# Patient Record
Sex: FEMALE | Employment: FULL TIME | URBAN - METROPOLITAN AREA
[De-identification: names, ages, dates, MRNs, and addresses within clinical notes are randomized per-mention and may not be internally consistent; named-entity substitution may affect disease eponyms.]

---

## 2019-12-12 ENCOUNTER — OFFICE VISIT (OUTPATIENT)
Dept: FAMILY MEDICINE CLINIC | Facility: CLINIC | Age: 34
End: 2019-12-12
Payer: COMMERCIAL

## 2019-12-12 VITALS
HEART RATE: 91 BPM | SYSTOLIC BLOOD PRESSURE: 142 MMHG | BODY MASS INDEX: 27.71 KG/M2 | OXYGEN SATURATION: 99 % | TEMPERATURE: 98.4 F | DIASTOLIC BLOOD PRESSURE: 82 MMHG | HEIGHT: 63 IN | WEIGHT: 156.4 LBS

## 2019-12-12 DIAGNOSIS — E66.3 OVER WEIGHT: ICD-10-CM

## 2019-12-12 DIAGNOSIS — J20.9 ACUTE BRONCHITIS, UNSPECIFIED ORGANISM: Primary | ICD-10-CM

## 2019-12-12 DIAGNOSIS — Z53.20 HIV SCREENING DECLINED: ICD-10-CM

## 2019-12-12 DIAGNOSIS — R03.0 BLOOD PRESSURE ELEVATED WITHOUT HISTORY OF HTN: ICD-10-CM

## 2019-12-12 PROCEDURE — 3008F BODY MASS INDEX DOCD: CPT | Performed by: FAMILY MEDICINE

## 2019-12-12 PROCEDURE — 1036F TOBACCO NON-USER: CPT | Performed by: FAMILY MEDICINE

## 2019-12-12 PROCEDURE — 99204 OFFICE O/P NEW MOD 45 MIN: CPT | Performed by: FAMILY MEDICINE

## 2019-12-12 RX ORDER — AZITHROMYCIN 250 MG/1
TABLET, FILM COATED ORAL
Qty: 6 TABLET | Refills: 0 | Status: SHIPPED | OUTPATIENT
Start: 2019-12-12 | End: 2019-12-17

## 2019-12-12 RX ORDER — DIPHENOXYLATE HYDROCHLORIDE AND ATROPINE SULFATE 2.5; .025 MG/1; MG/1
1 TABLET ORAL DAILY
COMMUNITY

## 2019-12-12 NOTE — PROGRESS NOTES
Assessment/Plan:          Diagnoses and all orders for this visit:    Acute bronchitis, unspecified organism  Comments:  Patient to call if not better or worse  Orders:  -     azithromycin (ZITHROMAX) 250 mg tablet; Take 2 tablets today then 1 tablet daily x 4 days    Blood pressure elevated without history of HTN  Comments:  Most likely secondary to ibuprofen and cough medicine  Advised pt to return to recheck BP  To check blood pressure at her office  If 130/80 or higher ,to call    HIV screening declined    Over weight  Comments:  Diet and exercise discussed    Other orders  -     multivitamin (THERAGRAN) TABS; Take 1 tablet by mouth daily  -     pseudoephedrine-acetaminophen (TYLENOL SINUS)  MG TABS; Take 1 tablet by mouth every 4 (four) hours as needed for congestion            Subjective:     Patient ID: Eri Enamorado is a 27 y o  female      Cough   started 1 week ago  Productive raising yellow phlegm  Moderate  Persistent  Getting worse  Patient felt feverish  But did not check her temperature  Positive chills  Denied chest pain or shortness of breath  Denied hemoptysis  Patient does not smoke  Patient has been taking cough medicine and ibuprofen  Elevated blood pressure today at the office  Patient denied any history of hypertension  Denied headache or dizziness  Overweight  Admit to regular food intake  Denied change in the weight  Cold intolerance or fatigue      Review of Systems   Constitutional: Positive for chills and fever  Negative for activity change, appetite change, fatigue and unexpected weight change  HENT: Negative for congestion, ear discharge, ear pain, hearing loss, nosebleeds, rhinorrhea, sinus pressure, sore throat, tinnitus, trouble swallowing and voice change  Eyes: Negative for photophobia, pain and visual disturbance  Respiratory: Positive for cough  Negative for chest tightness, shortness of breath and wheezing      Cardiovascular: Negative for chest pain, palpitations and leg swelling  Gastrointestinal: Negative for abdominal pain, anal bleeding, blood in stool, constipation, diarrhea, nausea and vomiting  Endocrine: Negative for cold intolerance, heat intolerance, polydipsia and polyuria  Genitourinary: Negative for dysuria, frequency, hematuria, urgency, vaginal bleeding and vaginal discharge  Musculoskeletal: Negative for arthralgias, back pain, gait problem, joint swelling, myalgias and neck pain  Skin: Negative for rash  Allergic/Immunologic: Negative for immunocompromised state  Neurological: Negative for dizziness, tremors, seizures, syncope, weakness, light-headedness and headaches  Hematological: Negative for adenopathy  Does not bruise/bleed easily  Psychiatric/Behavioral: Negative for agitation, behavioral problems, confusion, dysphoric mood, hallucinations and sleep disturbance  The patient is not nervous/anxious and is not hyperactive  Objective:     Physical Exam   Constitutional: She is oriented to person, place, and time  She appears well-developed and well-nourished  She does not appear ill  No distress  HENT:   Head: Normocephalic and atraumatic  Right Ear: Tympanic membrane and ear canal normal  No drainage  Left Ear: Tympanic membrane and ear canal normal  No drainage  Mouth/Throat: Mucous membranes are normal  No oral lesions  No uvula swelling  Posterior oropharyngeal erythema present  No oropharyngeal exudate or posterior oropharyngeal edema  Tonsils are 0 on the right  Tonsils are 0 on the left  No tonsillar exudate  Eyes: Pupils are equal, round, and reactive to light  Conjunctivae and EOM are normal  No scleral icterus  Neck: Neck supple  No JVD present  No thyromegaly present  Cardiovascular: Normal rate, regular rhythm and normal heart sounds  No murmur heard  Extremities  No edema   Pulmonary/Chest: Effort normal  She has no wheezes  She has rhonchi  She has no rales  Abdominal: Soft   She exhibits no distension and no mass  There is no tenderness  There is no rebound and no guarding  No hernia  Lymphadenopathy:     She has cervical adenopathy  Neurological: She is alert and oriented to person, place, and time  No cranial nerve deficit  She exhibits normal muscle tone  Coordination normal    Gait is normal   Skin: No rash noted  She is not diaphoretic  Psychiatric: She has a normal mood and affect  Her behavior is normal  Judgment normal    BMI Counseling: Body mass index is 27 71 kg/m²  The BMI is above normal  Nutrition recommendations include decreasing portion sizes

## 2020-01-29 ENCOUNTER — OFFICE VISIT (OUTPATIENT)
Dept: FAMILY MEDICINE CLINIC | Facility: CLINIC | Age: 35
End: 2020-01-29
Payer: COMMERCIAL

## 2020-01-29 VITALS
OXYGEN SATURATION: 99 % | BODY MASS INDEX: 27.68 KG/M2 | TEMPERATURE: 98.4 F | DIASTOLIC BLOOD PRESSURE: 74 MMHG | WEIGHT: 156.2 LBS | HEIGHT: 63 IN | SYSTOLIC BLOOD PRESSURE: 149 MMHG | HEART RATE: 100 BPM

## 2020-01-29 DIAGNOSIS — Z13.220 SCREENING FOR LIPID DISORDERS: ICD-10-CM

## 2020-01-29 DIAGNOSIS — Z28.20 IMMUNIZATION NOT CARRIED OUT BECAUSE OF PATIENT DECISION: ICD-10-CM

## 2020-01-29 DIAGNOSIS — L65.9 HAIR LOSS: ICD-10-CM

## 2020-01-29 DIAGNOSIS — Z13.1 SCREENING FOR DIABETES MELLITUS: ICD-10-CM

## 2020-01-29 DIAGNOSIS — E66.3 OVER WEIGHT: ICD-10-CM

## 2020-01-29 DIAGNOSIS — I10 HYPERTENSION, UNSPECIFIED TYPE: ICD-10-CM

## 2020-01-29 DIAGNOSIS — Z00.00 WELL ADULT EXAM: Primary | ICD-10-CM

## 2020-01-29 DIAGNOSIS — Z12.4 SCREENING FOR CERVICAL CANCER: ICD-10-CM

## 2020-01-29 PROCEDURE — 1036F TOBACCO NON-USER: CPT | Performed by: FAMILY MEDICINE

## 2020-01-29 PROCEDURE — 3078F DIAST BP <80 MM HG: CPT | Performed by: FAMILY MEDICINE

## 2020-01-29 PROCEDURE — 99395 PREV VISIT EST AGE 18-39: CPT | Performed by: FAMILY MEDICINE

## 2020-01-29 PROCEDURE — 3077F SYST BP >= 140 MM HG: CPT | Performed by: FAMILY MEDICINE

## 2020-01-29 PROCEDURE — 3008F BODY MASS INDEX DOCD: CPT | Performed by: FAMILY MEDICINE

## 2020-01-29 PROCEDURE — 99214 OFFICE O/P EST MOD 30 MIN: CPT | Performed by: FAMILY MEDICINE

## 2020-01-29 NOTE — PROGRESS NOTES
Assessment/Plan:       No problem-specific Assessment & Plan notes found for this encounter  Diagnoses and all orders for this visit:    Well adult exam  Comments:  Diet discussed  Also advised to exercise about 150 minutes a week    Hypertension, unspecified type  Comments:  Most likely benign HTN  Pt to continue check blood pressure at her office and call me with the results  To consider rule out secondary HTN  pt declined CXR  Orders:  -     Comprehensive metabolic panel; Future  -     UA w Reflex to Microscopic w Reflex to Culture  -     ECG 12 lead; Future    Hair loss  Comments:  Patient declined to check RPR,Testosterone level, DHEA, and ANGELITO   Orders:  -     CBC and differential; Future  -     Comprehensive metabolic panel; Future  -     Folate; Future  -     Iron Saturation %; Future  -     Vitamin B12; Future  -     TSH, 3rd generation with Free T4 reflex; Future  -     Iron; Future  -     Ferritin; Future    Screening for lipid disorders  -     Lipid Panel with Direct LDL reflex; Future    Screening for diabetes mellitus  -     Hemoglobin A1C; Future    Immunization not carried out because of patient decision    Screening for cervical cancer  -     Ambulatory referral to Obstetrics / Gynecology; Future    Over weight        Patient Instructions   Patient to follow up with test results      Orders Placed This Encounter   Procedures    CBC and differential     This is a patient instruction: This test is non-fasting  Please drink two glasses of water morning of bloodwork  Standing Status:   Future     Standing Expiration Date:   1/29/2021    Comprehensive metabolic panel     This is a patient instruction: Patient fasting for 8 hours or longer recommended       Standing Status:   Future     Standing Expiration Date:   1/29/2021    Folate     Standing Status:   Future     Standing Expiration Date:   1/29/2021    Iron Saturation %     Standing Status:   Future     Standing Expiration Date: 1/29/2021    Vitamin B12     Standing Status:   Future     Standing Expiration Date:   2/29/2020    TSH, 3rd generation with Free T4 reflex     Standing Status:   Future     Standing Expiration Date:   1/29/2021    Iron     Standing Status:   Future     Standing Expiration Date:   1/29/2021    Ferritin     Standing Status:   Future     Standing Expiration Date:   1/29/2021    UA w Reflex to Microscopic w Reflex to Culture    Lipid Panel with Direct LDL reflex     This is a patient instruction: This test requires patient fasting for 10-12 hours or longer  Drinking of black coffee or black tea is acceptable  Standing Status:   Future     Standing Expiration Date:   1/29/2021    Hemoglobin A1C     Standing Status:   Future     Standing Expiration Date:   1/29/2021    Ambulatory referral to Obstetrics / Gynecology     Standing Status:   Future     Standing Expiration Date:   1/29/2021     Referral Priority:   Routine     Referral Type:   Consult - AMB     Referral Reason:   Specialty Services Required     Referred to Provider:   Rick Grier MD     Requested Specialty:   Obstetrics and Gynecology     Number of Visits Requested:   1     Expiration Date:   1/29/2021    ECG 12 lead     Standing Status:   Future     Standing Expiration Date:   1/29/2021         Subjective:     Patient ID: Judi Villeda is a 38 y o  female      For annual physical   Patient feels well  Denied depression or anxiety  Denied smoking, drinking alcohol or drug abuse  Eye care  She just had an eye exam   She wear glasses  Dental care also she sees a dentist   Decrease  Gyn care  Last time she had gyn exam more than 3 years ago  Patient is   Is trying to get pregnant  She has 9years old daughter  Immunization  Reviewed patient stated she had Tdap in 2015  HIV screening patient stated she had HIV screening less than 1 year and it was negative  Diet  Regular diet  Exercise  Does not exercise  Family history  Mother and father has diabetes      Review of Systems   Constitutional: Negative for activity change, appetite change, chills, fatigue, fever and unexpected weight change  HENT: Negative for congestion, ear discharge, ear pain, hearing loss, nosebleeds, rhinorrhea, sinus pressure, sinus pain, sore throat, tinnitus, trouble swallowing and voice change  Eyes: Negative for photophobia, pain, redness, itching and visual disturbance  Respiratory: Negative for cough, chest tightness, shortness of breath, wheezing and stridor  Cardiovascular: Negative for chest pain, palpitations and leg swelling  Gastrointestinal: Negative for abdominal distention, abdominal pain, anal bleeding, blood in stool, constipation, diarrhea, nausea and vomiting  Endocrine: Negative for cold intolerance, heat intolerance, polydipsia and polyuria  Genitourinary: Negative for difficulty urinating, dysuria, frequency, hematuria, urgency, vaginal bleeding, vaginal discharge and vaginal pain  Musculoskeletal: Negative for arthralgias, back pain, gait problem, joint swelling, myalgias and neck pain  Skin: Negative for rash  Allergic/Immunologic: Negative for immunocompromised state  Neurological: Negative for dizziness, tremors, seizures, syncope, weakness, light-headedness and headaches  Hematological: Negative for adenopathy  Does not bruise/bleed easily  Psychiatric/Behavioral: Negative for agitation, behavioral problems, confusion, decreased concentration, dysphoric mood and hallucinations  The patient is not nervous/anxious  Objective:     Physical Exam   Constitutional: She is oriented to person, place, and time  She appears well-developed and well-nourished  No distress  HENT:   Head: Normocephalic and atraumatic  Right Ear: External ear normal    Left Ear: External ear normal    Nose: Nose normal    Mouth/Throat: Oropharynx is clear and moist  No oropharyngeal exudate     Whisper test is normal bilaterally, 5 ft away  Mild loss hair most pronounced at the vertex and the hairline  No scalp lesion   Eyes: Pupils are equal, round, and reactive to light  Conjunctivae and EOM are normal  Right eye exhibits no discharge  Left eye exhibits no discharge  No scleral icterus  Neck: Normal range of motion  Neck supple  No JVD present  No thyromegaly present  Cardiovascular: Normal rate, regular rhythm, normal heart sounds and intact distal pulses  No murmur heard  Pulses:       Carotid pulses are 3+ on the right side, and 3+ on the left side  Dorsalis pedis pulses are 3+ on the right side, and 3+ on the left side  Posterior tibial pulses are 3+ on the right side, and 3+ on the left side  Pulmonary/Chest: Effort normal and breath sounds normal  No stridor  No respiratory distress  She has no wheezes  She has no rales  Abdominal: Soft  Bowel sounds are normal  She exhibits no distension and no mass  There is no tenderness  There is no rebound and no guarding  No hernia  Musculoskeletal: Normal range of motion  She exhibits no edema or deformity  Lymphadenopathy:     She has no cervical adenopathy  Neurological: She is alert and oriented to person, place, and time  She displays normal reflexes  No cranial nerve deficit or sensory deficit  She exhibits normal muscle tone  Coordination normal    Skin: No rash noted  She is not diaphoretic  Psychiatric: She has a normal mood and affect  Her behavior is normal  Judgment and thought content normal    BMI Counseling: Body mass index is 27 67 kg/m²  The BMI is above normal  Nutrition recommendations include decreasing portion sizes  Exercise recommendations include moderate physical activity 150 minutes/week

## 2020-01-29 NOTE — PROGRESS NOTES
Assessment/Plan:       No problem-specific Assessment & Plan notes found for this encounter  Diagnoses and all orders for this visit:    Well adult exam  Comments:  Diet discussed  Also advised to exercise about 150 minutes a week    Hypertension, unspecified type  Comments:  Most likely benign HTN  Pt to continue check blood pressure at her office and call me with the results  To consider rule out secondary HTN  pt declined CXR  Orders:  -     Comprehensive metabolic panel; Future  -     UA w Reflex to Microscopic w Reflex to Culture  -     ECG 12 lead; Future    Hair loss  Comments:  Patient declined to check RPR,Testosterone level, DHEA, and ANGELITO   Orders:  -     CBC and differential; Future  -     Comprehensive metabolic panel; Future  -     Folate; Future  -     Iron Saturation %; Future  -     Vitamin B12; Future  -     TSH, 3rd generation with Free T4 reflex; Future  -     Iron; Future  -     Ferritin; Future    Over weight  Comments:  Advised to lose weight    Screening for lipid disorders  -     Lipid Panel with Direct LDL reflex; Future    Screening for diabetes mellitus  -     Hemoglobin A1C; Future    Immunization not carried out because of patient decision  Comments:  Patient declined flu shot and Prevnar    Screening for cervical cancer  -     Ambulatory referral to Obstetrics / Gynecology; Future        Patient Instructions   Patient to follow up with test results      Orders Placed This Encounter   Procedures    CBC and differential     This is a patient instruction: This test is non-fasting  Please drink two glasses of water morning of bloodwork  Standing Status:   Future     Standing Expiration Date:   1/29/2021    Comprehensive metabolic panel     This is a patient instruction: Patient fasting for 8 hours or longer recommended       Standing Status:   Future     Standing Expiration Date:   1/29/2021    Folate     Standing Status:   Future     Standing Expiration Date:   1/29/2021  Iron Saturation %     Standing Status:   Future     Standing Expiration Date:   1/29/2021    Vitamin B12     Standing Status:   Future     Standing Expiration Date:   2/29/2020    TSH, 3rd generation with Free T4 reflex     Standing Status:   Future     Standing Expiration Date:   1/29/2021    Iron     Standing Status:   Future     Standing Expiration Date:   1/29/2021    Ferritin     Standing Status:   Future     Standing Expiration Date:   1/29/2021    UA w Reflex to Microscopic w Reflex to Culture    Lipid Panel with Direct LDL reflex     This is a patient instruction: This test requires patient fasting for 10-12 hours or longer  Drinking of black coffee or black tea is acceptable  Standing Status:   Future     Standing Expiration Date:   1/29/2021    Hemoglobin A1C     Standing Status:   Future     Standing Expiration Date:   1/29/2021    Ambulatory referral to Obstetrics / Gynecology     Standing Status:   Future     Standing Expiration Date:   1/29/2021     Referral Priority:   Routine     Referral Type:   Consult - AMB     Referral Reason:   Specialty Services Required     Referred to Provider:   Larissa Lentz MD     Requested Specialty:   Obstetrics and Gynecology     Number of Visits Requested:   1     Expiration Date:   1/29/2021    ECG 12 lead     Standing Status:   Future     Standing Expiration Date:   1/29/2021         Subjective:     Patient ID: Edwardo Guerra is a 38 y o  female      Hair loss  Patient stated about 3 years ago  She lost hair  Mild  Persistent  Diffuse but most prominent at the vertex of the scalp  At that time she was under stress at school  Patient developed thinning of the hair  But since then she has no further hair loss  Patient denied family history of alopecia, use to dye her hair  Elevated blood pressure  Patient has been checking her blood pressure at her work  She has a dentist   Her blood pressure running between 120-130/70-80    Patient denied headache, flushing or dizziness  Overweight  Patient not able to exercise due to a lack of time  Admit to regular diet  Denied weight gain  Review of Systems   Constitutional: Negative for appetite change and fatigue  HENT: Negative for ear pain, tinnitus, trouble swallowing and voice change  Eyes: Negative for photophobia, pain and visual disturbance  Respiratory: Negative for cough, chest tightness and wheezing  Cardiovascular: Negative for chest pain, palpitations and leg swelling  Gastrointestinal: Negative for abdominal distention, abdominal pain, anal bleeding, constipation, diarrhea, nausea and rectal pain  Endocrine: Negative for cold intolerance, heat intolerance, polydipsia and polyuria  Genitourinary: Negative for decreased urine volume, difficulty urinating, dysuria, flank pain, frequency, hematuria and urgency  Musculoskeletal: Negative for arthralgias, back pain, gait problem, myalgias and neck pain  Skin: Negative for pallor and rash  Allergic/Immunologic: Negative for immunocompromised state  Neurological: Negative for dizziness, seizures, syncope and speech difficulty  Hematological: Negative for adenopathy  Does not bruise/bleed easily  Psychiatric/Behavioral: Negative for agitation, confusion and hallucinations  The patient is not nervous/anxious  Objective:     Physical Exam   Constitutional: She is oriented to person, place, and time  She appears well-developed and well-nourished  No distress  HENT:   Head: Normocephalic and atraumatic  Scalp  No lesion  There is mild diffuse hair thinning, most pronounced at the vertex and the hairline   Eyes: Pupils are equal, round, and reactive to light  Conjunctivae and EOM are normal  No scleral icterus  Neck: No JVD present  No thyromegaly present  Cardiovascular: Normal rate, regular rhythm and normal heart sounds  No murmur heard  Extremities    No edema   Pulmonary/Chest: Effort normal and breath sounds normal    Abdominal: Soft  She exhibits no mass  There is no tenderness  There is no rebound and no guarding  No hernia  Lymphadenopathy:     She has no cervical adenopathy  Neurological: She is alert and oriented to person, place, and time  No cranial nerve deficit  She exhibits normal muscle tone  Coordination normal    Skin: No rash noted  Psychiatric: She has a normal mood and affect   Her behavior is normal  Judgment normal

## 2020-04-29 ENCOUNTER — TELEPHONE (OUTPATIENT)
Dept: FAMILY MEDICINE CLINIC | Facility: CLINIC | Age: 35
End: 2020-04-29

## 2020-06-25 LAB — EXTERNAL HIV SCREEN: NORMAL

## 2021-01-20 ENCOUNTER — TELEPHONE (OUTPATIENT)
Dept: ADMINISTRATIVE | Facility: OTHER | Age: 36
End: 2021-01-20

## 2021-01-20 NOTE — TELEPHONE ENCOUNTER
----- Message from Yulia Marte, [address] sent at 1/20/2021  8:54 AM EST -----  Regarding: Tone Aguirre Request  01/20/21 8:54 AM    Hello, our patient Paty Turcios has had HIV completed/performed  Please assist in updating the patient chart by pulling the Care Everywhere (CE) document   The date of service is 6/25/2020     Thank you,  Yulia Marte MA   [facility]

## 2021-01-21 NOTE — TELEPHONE ENCOUNTER
Upon review of the In Basket request we were able to locate, review, and update the patient chart as requested for Pap Smear (HPV) aka Cervical Cancer Screening  Any additional questions or concerns should be emailed to the Practice Liaisons via [email or web address] email, please do not reply via In Basket      Thank you  Lm Agrawal

## 2021-07-15 ENCOUNTER — TELEPHONE (OUTPATIENT)
Dept: POSTPARTUM | Facility: CLINIC | Age: 36
End: 2021-07-15

## 2021-07-15 NOTE — TELEPHONE ENCOUNTER
Spoke with Isadora Cristobal (8days) will not latch - mom pumping to feed - would like assistance      Appt is set for 7/22 - mom requesting call to discuss

## 2023-06-30 ENCOUNTER — TELEPHONE (OUTPATIENT)
Age: 38
End: 2023-06-30

## 2023-07-17 ENCOUNTER — TELEPHONE (OUTPATIENT)
Age: 38
End: 2023-07-17
